# Patient Record
Sex: MALE | Race: WHITE | NOT HISPANIC OR LATINO | Employment: FULL TIME | URBAN - METROPOLITAN AREA
[De-identification: names, ages, dates, MRNs, and addresses within clinical notes are randomized per-mention and may not be internally consistent; named-entity substitution may affect disease eponyms.]

---

## 2024-01-18 ENCOUNTER — HOSPITAL ENCOUNTER (EMERGENCY)
Facility: HOSPITAL | Age: 44
Discharge: HOME | End: 2024-01-18
Attending: EMERGENCY MEDICINE
Payer: COMMERCIAL

## 2024-01-18 VITALS
RESPIRATION RATE: 16 BRPM | HEART RATE: 77 BPM | TEMPERATURE: 98.2 F | OXYGEN SATURATION: 96 % | DIASTOLIC BLOOD PRESSURE: 94 MMHG | SYSTOLIC BLOOD PRESSURE: 136 MMHG | WEIGHT: 208 LBS | BODY MASS INDEX: 27.57 KG/M2 | HEIGHT: 73 IN

## 2024-01-18 DIAGNOSIS — T78.40XA ALLERGIC REACTION TO DRUG, INITIAL ENCOUNTER: Primary | ICD-10-CM

## 2024-01-18 DIAGNOSIS — B02.9 HERPES ZOSTER WITHOUT COMPLICATION: ICD-10-CM

## 2024-01-18 LAB
HOLD SPECIMEN: NORMAL
HOLD SPECIMEN: NORMAL
WHOLE BLOOD HOLD COAG: NORMAL
WHOLE BLOOD HOLD SPECIMEN: NORMAL

## 2024-01-18 PROCEDURE — 96375 TX/PRO/DX INJ NEW DRUG ADDON: CPT

## 2024-01-18 PROCEDURE — 99283 EMERGENCY DEPT VISIT LOW MDM: CPT

## 2024-01-18 PROCEDURE — 25010000002 METHYLPREDNISOLONE PER 125 MG

## 2024-01-18 PROCEDURE — 96374 THER/PROPH/DIAG INJ IV PUSH: CPT

## 2024-01-18 RX ORDER — FAMOTIDINE 10 MG/ML
20 INJECTION, SOLUTION INTRAVENOUS ONCE
Status: COMPLETED | OUTPATIENT
Start: 2024-01-18 | End: 2024-01-18

## 2024-01-18 RX ORDER — METHYLPREDNISOLONE SODIUM SUCCINATE 125 MG/2ML
125 INJECTION, POWDER, LYOPHILIZED, FOR SOLUTION INTRAMUSCULAR; INTRAVENOUS ONCE
Status: COMPLETED | OUTPATIENT
Start: 2024-01-18 | End: 2024-01-18

## 2024-01-18 RX ORDER — PREDNISONE 20 MG/1
40 TABLET ORAL DAILY
Qty: 10 TABLET | Refills: 0 | Status: SHIPPED | OUTPATIENT
Start: 2024-01-18 | End: 2024-01-25

## 2024-01-18 RX ORDER — SODIUM CHLORIDE 0.9 % (FLUSH) 0.9 %
10 SYRINGE (ML) INJECTION AS NEEDED
Status: DISCONTINUED | OUTPATIENT
Start: 2024-01-18 | End: 2024-01-18 | Stop reason: HOSPADM

## 2024-01-18 RX ADMIN — FAMOTIDINE 20 MG: 10 INJECTION INTRAVENOUS at 19:33

## 2024-01-18 RX ADMIN — METHYLPREDNISOLONE SODIUM SUCCINATE 125 MG: 125 INJECTION, POWDER, FOR SOLUTION INTRAMUSCULAR; INTRAVENOUS at 19:35

## 2024-01-18 NOTE — Clinical Note
[facility] EMERGENCY ROOM  [address]  [place] [address]  Phone: [phone]    Agapito Hernandez was seen and treated in our emergency department on 1/18/2024.  He may return to work on 01/20/2024.         Thank you for choosing [facility].    Mohsen Ruiz MD

## 2024-01-19 NOTE — ED PROVIDER NOTES
"Time: 7:13 PM EST  Date of encounter:  1/18/2024  Independent Historian/Clinical History and Information was obtained by:   Patient    History is limited by: N/A    Chief Complaint   Patient presents with    Allergic Reaction         History of Present Illness:  Patient is a 43 y.o. year old male who presents to the emergency department for evaluation of welt like rash to trunk of body.  Patient was recently diagnosed with shingles and started taking valacyclovir today.  After he took his first dose, he laid down to take a nap, and then when he woke up he noticed the rash mostly on his back and left side and also a bit of puffiness or itching in his eyelids.  He has taken acyclovir and Valtrex in the past and had no trouble, but on a much lower dose previously.  He denies any difficulty breathing, and difficulty swallowing, or any itchiness.  He also denies any other new products or foods.  He is in no acute distress and breath sounds are clear with no wheezing or stridor appreciated on exam.       Patient Care Team  Primary Care Provider: Provider, No Known    Past Medical History:   History of HSV  No Known Allergies  History reviewed. No pertinent past medical history.  History reviewed. No pertinent surgical history.  History reviewed. No pertinent family history.    Home Medications:  Prior to Admission medications    Not on File        Social History:   Social History     Tobacco Use    Smoking status: Never    Smokeless tobacco: Never   Vaping Use    Vaping Use: Every day   Substance Use Topics    Alcohol use: Not Currently     Lives at home.      Review of Systems:  Review of Systems   I performed a 10 point review of systems which was all negative, except for the positives found in the HPI above.  Physical Exam:  /94 (BP Location: Left arm, Patient Position: Sitting)   Pulse 77   Temp 98.2 °F (36.8 °C) (Oral)   Resp 16   Ht 185.4 cm (73\")   Wt 94.3 kg (208 lb)   SpO2 96%   BMI 27.44 kg/m² "         Physical Exam   General: Awake alert and in no obvious distress    HEENT: Head normocephalic atraumatic, eyes PERRLA EOMI, nose normal, oropharynx normal.  No oral lesions.    Neck: Supple full range of motion, no meningismus, no lymphadenopathy    Heart: Regular rate and rhythm, no murmurs or rubs, 2+ radial pulses bilaterally    Lungs: Clear to auscultation bilaterally without wheezes or crackles, no respiratory distress    Abdomen: Soft, nontender, nondistended, no rebound or guarding    Skin: Warm, dry, urticarial rash on back already clearing up after medication    Musculoskeletal: Normal range of motion, no lower extremity edema    Neurologic: Oriented x3, no motor deficits no sensory deficits    Psychiatric: Mood appears stable, no psychosis              Procedures:  Procedures      Medical Decision Making:      Comorbidities that affect care:    Herpes zoster    External Notes reviewed:    None      The following orders were placed and all results were independently analyzed by me:  Orders Placed This Encounter   Procedures    [place] Draw    Insert peripheral IV    Green Top (Gel)    Lavender Top    Gold Top - SST    Light Blue Top       Medications Given in the Emergency Department:  Medications   sodium chloride 0.9 % flush 10 mL (has no administration in time range)   methylPREDNISolone sodium succinate (SOLU-Medrol) injection 125 mg (125 mg Intravenous Given 1/18/24 1935)   famotidine (PEPCID) injection 20 mg (20 mg Intravenous Given 1/18/24 1933)        ED Course:    The patient was initially evaluated in the triage area where orders were placed. The patient was later dispositioned by Mohsen Ruiz MD.      The patient was advised to stay for completion of workup which includes but is not limited to communication of labs and radiological results, reassessment and plan. The patient was advised that leaving prior to disposition by a provider could result in critical findings that are not  communicated to the patient.     ED Course as of 01/18/24 2149   Thu Jan 18, 2024   1910   --- PROVIDER IN TRIAGE NOTE ---    Patient was seen and evaluated in triage by SIS Harper.  Orders were written and the patient is currently awaiting disposition.   [MS]      ED Course User Index  [MS] Kirk SIS Avalos       Labs:    Lab Results (last 24 hours)       ** No results found for the last 24 hours. **             Imaging:    No Radiology Exams Resulted Within Past 24 Hours      Differential Diagnosis and Discussion:      Allergic Reaction: Differential diagnosis includes but is not limited to drug side effects, contact dermatitis, autoimmune conditions, infections, mast cell disorders, serum sickness, anaphylactoid reactions, angioedema, panic or anxiety attacks.        MDM           This patient is a 43-year-old male recently diagnosed with shingles and started on Valtrex 1 g by mouth 3 times a day for 7 days took his first dose and shortly after developed an urticarial rash on his back.    He states he has actually been on this medication previously but at a lower dose and tolerated well.    We gave him some IV Pepcid and Solu-Medrol in the ED and it looks like it is already clearing up.    I do not see any airway involvement or signs of angioedema or anaphylaxis.    I think can be safely discharged home and I offered to try to switch him over to acyclovir but he states he usually tolerates Valtrex just at a lower dose so we will have him use the lower dose for now and I will call in some prednisone to help with any signs of allergic reaction in addition he can use some over-the-counter Benadryl for hives.                    Patient Care Considerations:          Consultants/Shared Management Plan:        Social Determinants of Health:    Patient is independent, reliable, and has access to care.       Disposition and Care Coordination:    Discharged: The patient is suitable and stable for  discharge with no need for consideration of admission.    I have explained the patient´s condition, diagnoses and treatment plan based on the information available to me at this time. I have answered questions and addressed any concerns. The patient has a good  understanding of the patient´s diagnosis, condition, and treatment plan as can be expected at this point. The vital signs have been stable. The patient´s condition is stable and appropriate for discharge from the emergency department.      The patient will pursue further outpatient evaluation with the primary care physician or other designated or consulting physician as outlined in the discharge instructions. They are agreeable to this plan of care and follow-up instructions have been explained in detail. The patient has received these instructions in written format and have expressed an understanding of the discharge instructions. The patient is aware that any significant change in condition or worsening of symptoms should prompt an immediate return to this or the closest emergency department or call to 292.  I have explained discharge medications and the need for follow up with the patient/caretakers. This was also printed in the discharge instructions. Patient was discharged with the following medications and follow up:      Medication List        New Prescriptions      predniSONE 20 MG tablet  Commonly known as: DELTASONE  Take 2 tablets by mouth Daily for 7 days.               Where to Get Your Medications        These medications were sent to [id number] - [place], [place] - [address] AT [facility] - [phone]  - [phone] FX  [address] [place] [address]      Phone: [phone]   predniSONE 20 MG tablet      Provider, No Known  [facility]  [place] [address]    Call in 2 days  As needed, If symptoms worsen, for a follow-up appointment       Final diagnoses:   Allergic  reaction to drug, initial encounter   Herpes zoster without complication        ED Disposition       ED Disposition   Discharge    Condition   Stable    Comment   --               This medical record created using voice recognition software.             Mohsen Ruiz MD  01/18/24 2451

## 2024-01-19 NOTE — DISCHARGE INSTRUCTIONS
Unfortunately, it looks like you are having some allergic reaction to the Valtrex medication for your shingles.    You may want to reduce the dosage since you have tolerated this medication before and also grab some over-the-counter Benadryl to use for any hives or skin rash or swelling or itching this week in addition to taking the prednisone once a day starting tomorrow afternoon (you were already given steroids IV that should cover you until then).